# Patient Record
Sex: MALE | Race: WHITE | NOT HISPANIC OR LATINO | Employment: OTHER | URBAN - METROPOLITAN AREA
[De-identification: names, ages, dates, MRNs, and addresses within clinical notes are randomized per-mention and may not be internally consistent; named-entity substitution may affect disease eponyms.]

---

## 2018-05-18 ENCOUNTER — HOSPITAL ENCOUNTER (EMERGENCY)
Facility: HOSPITAL | Age: 38
Discharge: HOME | End: 2018-05-18

## 2018-05-18 VITALS
SYSTOLIC BLOOD PRESSURE: 132 MMHG | RESPIRATION RATE: 20 BRPM | BODY MASS INDEX: 25.67 KG/M2 | DIASTOLIC BLOOD PRESSURE: 72 MMHG | WEIGHT: 200 LBS | HEART RATE: 78 BPM | TEMPERATURE: 98 F | HEIGHT: 74 IN | OXYGEN SATURATION: 98 %

## 2018-05-18 DIAGNOSIS — I10 HYPERTENSION, UNSPECIFIED TYPE: ICD-10-CM

## 2018-05-18 DIAGNOSIS — M17.11 OSTEOARTHRITIS OF RIGHT KNEE, UNSPECIFIED OSTEOARTHRITIS TYPE: ICD-10-CM

## 2018-05-18 DIAGNOSIS — M25.569 KNEE PAIN: ICD-10-CM

## 2018-05-18 DIAGNOSIS — S83.91XA SPRAIN OF RIGHT KNEE, UNSPECIFIED LIGAMENT, INITIAL ENCOUNTER: Primary | ICD-10-CM

## 2018-05-18 PROCEDURE — 99283 EMERGENCY DEPT VISIT LOW MDM: CPT | Mod: 25

## 2018-05-18 PROCEDURE — 29505 APPLICATION LONG LEG SPLINT: CPT | Mod: RT

## 2018-05-18 RX ORDER — DICLOFENAC SODIUM 75 MG/1
75 TABLET, DELAYED RELEASE ORAL 2 TIMES DAILY
Qty: 20 TABLET | Refills: 0 | Status: SHIPPED | OUTPATIENT
Start: 2018-05-18 | End: 2022-01-21 | Stop reason: CLARIF

## 2018-05-18 RX ORDER — PREDNISONE 20 MG/1
40 TABLET ORAL DAILY
Qty: 10 TABLET | Refills: 0 | Status: SHIPPED | OUTPATIENT
Start: 2018-05-18 | End: 2018-05-23

## 2018-05-18 NOTE — ED PROVIDER NOTES
SCRIBE #1 NOTE: I, Rolf José Antonio, am scribing for, and in the presence of, AGUILA English. I have scribed the entire note.      History      Chief Complaint   Patient presents with    Knee Pain     + right knee pain        Review of patient's allergies indicates:  No Known Allergies     HPI   HPI    5/18/2018, 4:23 PM   History obtained from the patient      History of Present Illness: Robert Hensley is a 38 y.o. male patient who presents to the Emergency Department for R knee pain which onset gradually yesterday. Pt reports twisting knee while coming down a ladder yesterday. He reports twisting it once more today. Pt denies any falls. Symptoms are constant and moderate in severity. No mitigating or exacerbating factors reported. No associated sxs. Patient denies any back pain, hip pain, neck pain, swelling/numbness/weakness, and all other sxs at this time. Pt denies any previous injury to his name. No further complaints or concerns at this time.         Arrival mode: Personal vehicle     PCP: None      Past Medical History:  History reviewed. Nothing pertinent.     Past Surgical History:  History reviewed. Nothing pertinent.    Family History:  History reviewed. Nothing pertinent.    Social History:  Social History     Social History Main Topics    Smoking status: Unknown    Smokeless tobacco: Unknown    Alcohol use Unknown    Drug use: Unknown    Sexual activity: Unknown       ROS   Review of Systems   Constitutional: Negative for fever.   HENT: Negative for sore throat.    Respiratory: Negative for shortness of breath.    Cardiovascular: Negative for chest pain and leg swelling.   Gastrointestinal: Negative for nausea.   Genitourinary: Negative for dysuria.   Musculoskeletal: Positive for arthralgias (R knee). Negative for back pain and neck pain.        (-) Hip pain   Skin: Negative for rash.   Neurological: Negative for weakness and numbness.   Hematological: Does not bruise/bleed easily.    All other systems reviewed and are negative.      Physical Exam      Initial Vitals [05/18/18 1601]   BP Pulse Resp Temp SpO2   135/78 82 20 98.5 °F (36.9 °C) 98 %      MAP       97          Physical Exam  Nursing Notes and Vital Signs Reviewed.  Constitutional: Patient is in no acute distress. Well-developed and well-nourished.  Head: Atraumatic. Normocephalic.  Eyes: PERRL. EOM intact. Conjunctivae are not pale. No scleral icterus.  ENT: Mucous membranes are moist. Oropharynx is clear and symmetric.    Neck: Supple. Full ROM. No lymphadenopathy.  Cardiovascular: Regular rate. Regular rhythm. No murmurs, rubs, or gallops. Distal pulses are 2+ and symmetric.  Pulmonary/Chest: No respiratory distress. Clear to auscultation bilaterally. No wheezing or rales.  Abdominal: Soft and non-distended.  There is no tenderness.  No rebound, guarding, or rigidity.   Musculoskeletal: No obvious deformities. No edema. No calf tenderness.  Right knee:  No obvious deformity. Tender along medial aspect.  ROM limited secondary to pain.  No increased warmth, erythema, induration or fluctuance.  No ligament laxity. DP and PT pulses are 2+.  Normal capillary refill.  Distal sensation is intact.  Skin: Warm and dry.  Neurological:  Alert, awake, and appropriate.  Normal speech.  No acute focal neurological deficits are appreciated.  Psychiatric: Normal affect. Good eye contact. Appropriate in content.    ED Course    Splint Application  Date/Time: 5/18/2018 4:42 PM  Performed by: TAMIKA SPARROW  Authorized by: ANTONIO VALVERDE   Location details: right knee  Splint type: Knee immobilizer.  Post-procedure: The splinted body part was neurovascularly unchanged following the procedure.  Patient tolerance: Patient tolerated the procedure well with no immediate complications        ED Vital Signs:  Vitals:    05/18/18 1601   BP: 135/78   Pulse: 82   Resp: 20   Temp: 98.5 °F (36.9 °C)   TempSrc: Oral   SpO2: 98%   Weight: 90.7 kg  "(200 lb)   Height: 6' 2" (1.88 m)       Imaging Results:  Imaging Results          X-Ray Knee Complete 4 Or More Views Right (Final result)  Result time 05/18/18 16:37:01    Final result by Deacon Tnoy MD (05/18/18 16:37:01)                 Impression:      No acute fracture or dislocation.      Electronically signed by: Deacon Tony MD  Date:    05/18/2018  Time:    16:37             Narrative:    EXAMINATION:  XR KNEE COMP 4 OR MORE VIEWS RIGHT    CLINICAL HISTORY:  Pain in unspecified knee    FINDINGS:  Results:  No evidence of acute fracture or dislocation.  Bony mineralization is normal.  Soft tissues are unremarkable. Lateral view of the right knee demonstrates no significant joint effusion.    Mild medial compartment narrowing and sclerosis.                                        The Emergency Provider reviewed the vital signs and test results, which are outlined above.    ED Discussion     4:43 PM: Reassessed pt at this time.  Pt states his condition has improved at this time. Discussed with pt all pertinent ED information and results. Discussed pt dx and plan of tx. Gave pt all f/u and return to the ED instructions. All questions and concerns were addressed at this time. Pt expresses understanding of information and instructions, and is comfortable with plan to discharge. Pt is stable for discharge.    I discussed with patient and/or family/caretaker that evaluation in the ED does not suggest any emergent or life threatening medical conditions requiring immediate intervention beyond what was provided in the ED, and I believe patient is safe for discharge.  Regardless, an unremarkable evaluation in the ED does not preclude the development or presence of a serious of life threatening condition. As such, patient was instructed to return immediately for any worsening or change in current symptoms.        ED Medication(s):  Medications - No data to display    New Prescriptions    DICLOFENAC (VOLTAREN) 75 " MG EC TABLET    Take 1 tablet (75 mg total) by mouth 2 (two) times daily.    PREDNISONE (DELTASONE) 20 MG TABLET    Take 2 tablets (40 mg total) by mouth once daily.       Follow-up Information     Amadeo Jones MD. Go in 2 days.    Specialty:  Orthopedic Surgery  Contact information:  [address] [place] [address] DR Tobias HERNANDEZ [id number] [phone]                     Medical Decision Making    Medical Decision Making:   Clinical Tests:   Radiological Study: Ordered and Reviewed           Scribe Attestation:   Scribe #1: I performed the above scribed service and the documentation accurately describes the services I performed. I attest to the accuracy of the note.    Attending:   Physician Attestation Statement for Scribe #1: I, AGUILA English, personally performed the services described in this documentation, as scribed by Rolf Chou, in my presence, and it is both accurate and complete.          Clinical Impression       ICD-10-CM ICD-9-CM   1. Sprain of right knee, unspecified ligament, initial encounter S83.91XA 844.9   2. Knee pain M25.569 719.46   3. Osteoarthritis of right knee, unspecified osteoarthritis type M17.11 715.96   4. Hypertension, unspecified type I10 401.9       Disposition:   Disposition: Discharged  Condition: Stable         AGUILA English  05/18/18 1165

## 2022-01-21 ENCOUNTER — HOSPITAL ENCOUNTER (EMERGENCY)
Facility: HOSPITAL | Age: 42
Discharge: HOME | End: 2022-01-21
Attending: EMERGENCY MEDICINE

## 2022-01-21 VITALS
RESPIRATION RATE: 18 BRPM | HEART RATE: 68 BPM | HEIGHT: 73 IN | DIASTOLIC BLOOD PRESSURE: 80 MMHG | BODY MASS INDEX: 26.39 KG/M2 | TEMPERATURE: 99 F | SYSTOLIC BLOOD PRESSURE: 143 MMHG | OXYGEN SATURATION: 97 %

## 2022-01-21 DIAGNOSIS — K08.89 TOOTHACHE: Primary | ICD-10-CM

## 2022-01-21 PROCEDURE — 99284 EMERGENCY DEPT VISIT MOD MDM: CPT | Mod: 25

## 2022-01-21 PROCEDURE — 25000003 PHARM REV CODE 250: Performed by: EMERGENCY MEDICINE

## 2022-01-21 RX ORDER — PENICILLIN V POTASSIUM 250 MG/1
500 TABLET, FILM COATED ORAL
Status: COMPLETED | OUTPATIENT
Start: 2022-01-21 | End: 2022-01-21

## 2022-01-21 RX ORDER — IBUPROFEN 400 MG/1
800 TABLET ORAL
Status: DISCONTINUED | OUTPATIENT
Start: 2022-01-21 | End: 2022-01-21

## 2022-01-21 RX ORDER — PENICILLIN V POTASSIUM 500 MG/1
500 TABLET, FILM COATED ORAL 4 TIMES DAILY
Qty: 28 TABLET | Refills: 0 | Status: SHIPPED | OUTPATIENT
Start: 2022-01-21 | End: 2022-01-28

## 2022-01-21 RX ORDER — HYDROCODONE BITARTRATE AND ACETAMINOPHEN 5; 325 MG/1; MG/1
1 TABLET ORAL EVERY 8 HOURS PRN
Qty: 10 TABLET | Refills: 0 | Status: SHIPPED | OUTPATIENT
Start: 2022-01-21

## 2022-01-21 RX ORDER — IBUPROFEN 400 MG/1
800 TABLET ORAL
Status: COMPLETED | OUTPATIENT
Start: 2022-01-21 | End: 2022-01-21

## 2022-01-21 RX ORDER — IBUPROFEN 800 MG/1
800 TABLET ORAL EVERY 6 HOURS PRN
Qty: 20 TABLET | Refills: 0 | Status: SHIPPED | OUTPATIENT
Start: 2022-01-21

## 2022-01-21 RX ADMIN — PENICILLIN V POTASIUM 500 MG: 250 TABLET ORAL at 09:01

## 2022-01-21 RX ADMIN — IBUPROFEN 800 MG: 400 TABLET, FILM COATED ORAL at 09:01

## 2022-01-22 NOTE — ED PROVIDER NOTES
"CHIEF COMPLAINT    Chief Complaint   Patient presents with    Dental Pain     Pt. C/o toothache x 3 days that comes and goes to the right jaw.  States he has broken tooth to right upper jaw.       HPI    Robert Hensley is a 41 y.o. male who presents complaining of a toothache.  He has fractured tooth in the right upper molar that he says has been throbbing and hurting pretty severely for the past 3 days.  Anything cold makes it worse.  Denies any fevers or chills or difficulty swallowing.  He has tried Tylenol without much relief. The severity of the symptoms is moderate.    CURRENT MEDICATIONS    Prior to Admission medications    Medication Sig Start Date End Date Taking? Authorizing Provider   diclofenac (VOLTAREN) 75 MG EC tablet Take 1 tablet (75 mg total) by mouth 2 (two) times daily. 5/18/18 1/21/22  AGUILA nEglish       ALLERGIES    Review of patient's allergies indicates:  No Known Allergies    PAST MEDICAL HISTORY  History reviewed. No pertinent past medical history.    SURGICAL HISTORY    History reviewed. No pertinent surgical history.    SOCIAL HISTORY    Social History     Socioeconomic History    Marital status: Legally    Tobacco Use    Smoking status: Current Every Day Smoker     Types: Vaping with nicotine    Smokeless tobacco: Never Used    Tobacco comment: "Quit smoking a few months ago"   Substance and Sexual Activity    Drug use: Never    Sexual activity: Yes     Partners: Female       FAMILY HISTORY    History reviewed. No pertinent family history.    REVIEW OF SYSTEMS    Constitutional:  No reported fever, chills, weight loss or weakness.   Eyes:  No reported photophobia or discharge. No visual changes  HENT:  No reported sore throat or ear pain.   Respiratory:  No reported cough or shortness of breath.   Cardiovascular:  No reported chest pain, palpitations or swelling.   GI:  No reported abdominal pain, nausea, vomiting, or diarrhea.   Musculoskeletal:  No " "reported back pain.   Skin:  No reported rash.   Neurologic:  No reported headache, focal weakness or sensory changes.   Endocrine:  No reported polyuria or polydypsia.   Lymphatic:  No reported swollen glands.   Psychiatric:  No reported depression, suicidal ideation or homicidal ideation.   All Systems otherwise negative except as noted in the Review of Systems and History of Present Illness.    PHYSICAL EXAM    VITAL SIGNS: BP (!) 143/80 (BP Location: Left arm, Patient Position: Sitting)   Pulse 68   Temp 98.6 °F (37 °C)   Resp 18   Ht 6' 1" (1.854 m)   SpO2 97%   BMI 26.39 kg/m²    Constitutional:  Well developed, Well nourished who appears stated age, No acute distress, Non-toxic appearance.   HENT:  Normocephalic, Atraumatic, Moist mucus membranes, No oral exudates or ulcerations, right upper molar with partial fracture and mild swelling of the gingiva, no appreciable abscess.  Respiratory: Breath sounds clear to auscultation bilaterally, No respiratory distress, No wheezing, No chest tenderness.   Cardiovascular:  Normal heart rate, Normal rhythm, No murmurs, No rubs, No gallops.   Musculoskeletal:  Intact distal pulses, No edema, No cyanosis, No clubbing. Good range of motion in all major joints. No major deformities noted. No tenderness to palpation.  Integument:  Warm, Dry, No erythema, No rash.   Psychiatric:  Affect normal, Judgment normal, Mood normal.     LABS  Pertinent labs reviewed. (See chart for details)   Labs Reviewed - No data to display    RADIOLOGY    Imaging Results    None       ED COURSE & MEDICAL DECISION MAKING    Medications   penicillin v potassium tablet 500 mg (has no administration in time range)   ibuprofen tablet 800 mg (has no administration in time range)       The patient presents with the history as noted above. The differential diagnosis would include but is not limited to:  Dental infection, dental abscess, Ehsan's angina     Patient presents with toothache has a " fracture to the upper posterior right molar.  Will place patient on penicillin, pain medication and have follow-up with a dentist next week.  ED return precautions given the patient.       FINAL IMPRESSION    1. Toothache          Portillo Gonzalez    The patient was discharged to home with the following prescriptions:   New Prescriptions    HYDROCODONE-ACETAMINOPHEN (NORCO) 5-325 MG PER TABLET    Take 1 tablet by mouth every 8 (eight) hours as needed for Pain.    IBUPROFEN (ADVIL,MOTRIN) 800 MG TABLET    Take 1 tablet (800 mg total) by mouth every 6 (six) hours as needed for Pain.    PENICILLIN V POTASSIUM (VEETID) 500 MG TABLET    Take 1 tablet (500 mg total) by mouth 4 (four) times daily. for 7 days       I stressed the importance of close follow-up with:  [facility] - Emergency Dept  [address] [place] [id number] [phone]    As needed, If symptoms worsen    Dentist    Schedule an appointment as soon as possible for a visit in 3 days        I discussed the differential diagnosis, treatment plan, and strict return precautions for any worsening symptoms or new concerning symptoms, and they stated understanding.        **Parts of this note were created using [email or web address] Voice Recognition software program. While efforts were made to correct any mistakes made by this voice recognition software program, nonsensical phrases may remain in this note.       Portillo Gonzalez,   01/21/22 1795